# Patient Record
Sex: FEMALE | Race: WHITE | NOT HISPANIC OR LATINO | URBAN - METROPOLITAN AREA
[De-identification: names, ages, dates, MRNs, and addresses within clinical notes are randomized per-mention and may not be internally consistent; named-entity substitution may affect disease eponyms.]

---

## 2023-02-18 PROBLEM — F17.200 TOBACCO USE DISORDER: Status: ACTIVE | Noted: 2023-02-18

## 2023-02-18 PROBLEM — Q51.9 UTERINE ANOMALY: Status: ACTIVE | Noted: 2023-02-18

## 2023-02-18 PROBLEM — B00.2 PRIMARY HERPES SIMPLEX INFECTION OF ORAL REGION: Status: ACTIVE | Noted: 2023-02-18

## 2023-02-18 PROBLEM — H00.024 HORDEOLUM INTERNUM OF LEFT UPPER EYELID: Status: ACTIVE | Noted: 2023-02-18

## 2023-02-18 PROBLEM — E66.3 OVERWEIGHT (BMI 25.0-29.9): Status: ACTIVE | Noted: 2023-02-18

## 2023-02-18 PROBLEM — L03.319 CELLULITIS OF PERIUMBILICAL REGION: Status: ACTIVE | Noted: 2023-02-18

## 2023-02-18 RX ORDER — ERYTHROMYCIN 5 MG/G
OINTMENT OPHTHALMIC
COMMUNITY
End: 2023-06-15 | Stop reason: ALTCHOICE

## 2023-03-06 ENCOUNTER — APPOINTMENT (OUTPATIENT)
Dept: PRIMARY CARE | Facility: CLINIC | Age: 28
End: 2023-03-06
Payer: COMMERCIAL

## 2023-04-05 ENCOUNTER — APPOINTMENT (OUTPATIENT)
Dept: PRIMARY CARE | Facility: CLINIC | Age: 28
End: 2023-04-05
Payer: COMMERCIAL

## 2023-04-06 ENCOUNTER — TELEPHONE (OUTPATIENT)
Dept: PRIMARY CARE | Facility: CLINIC | Age: 28
End: 2023-04-06
Payer: COMMERCIAL

## 2023-04-06 NOTE — TELEPHONE ENCOUNTER
5:50 pm  Called patient per her request yesterday after speaking with office staff.  Voicemail full.      Staff reported that patient called yesterday stating she would be 20+ minutes late for her appointment that she believed to be scheduled at 4:30 pm.  Staff advised the patient that they had made multiple attempts to reach her, her appointment needed to be rescheduled because the provider would be out of the office at that time and the patient's voicemail was full.  The patient became angry and was verbally abusive to staff.  Skylar Jimenez MA, then spoke with the patient in an effort to calm her down and make a new appointment for her.  At that time the patient continued to be verbally abusive and demanded to be seen.  Again, staff advised the patient that our office was willing to see her but needed to move the appointment to a different day and time when the provider would be in the office.  The patient then asked to speak to the  and was advised I was at my other location and would be back in today and would call her.  She asked for the manager's name and when it was provided she continued to use curse words to describe her expectations of the manager.      Due to verbal abuse Dr. Romero does not wish to see this patient as a new patient.  Chart updated to reflect that she should not be rescheduled at this office.

## 2023-06-15 ENCOUNTER — OFFICE VISIT (OUTPATIENT)
Dept: PRIMARY CARE | Facility: CLINIC | Age: 28
End: 2023-06-15
Payer: COMMERCIAL

## 2023-06-15 VITALS
SYSTOLIC BLOOD PRESSURE: 124 MMHG | WEIGHT: 175 LBS | HEART RATE: 89 BPM | DIASTOLIC BLOOD PRESSURE: 87 MMHG | OXYGEN SATURATION: 98 % | BODY MASS INDEX: 29.16 KG/M2 | HEIGHT: 65 IN

## 2023-06-15 DIAGNOSIS — R53.83 OTHER FATIGUE: ICD-10-CM

## 2023-06-15 DIAGNOSIS — L21.0 DANDRUFF IN ADULT: Primary | ICD-10-CM

## 2023-06-15 DIAGNOSIS — L70.8 OTHER ACNE: ICD-10-CM

## 2023-06-15 DIAGNOSIS — Z13.220 SCREENING FOR HYPERLIPIDEMIA: ICD-10-CM

## 2023-06-15 DIAGNOSIS — Z13.1 SCREENING FOR DIABETES MELLITUS: ICD-10-CM

## 2023-06-15 DIAGNOSIS — Z13.21 ENCOUNTER FOR VITAMIN DEFICIENCY SCREENING: ICD-10-CM

## 2023-06-15 DIAGNOSIS — Z11.3 SCREEN FOR STD (SEXUALLY TRANSMITTED DISEASE): ICD-10-CM

## 2023-06-15 PROBLEM — H00.024 HORDEOLUM INTERNUM OF LEFT UPPER EYELID: Status: RESOLVED | Noted: 2023-02-18 | Resolved: 2023-06-15

## 2023-06-15 PROBLEM — L03.319 CELLULITIS OF PERIUMBILICAL REGION: Status: RESOLVED | Noted: 2023-02-18 | Resolved: 2023-06-15

## 2023-06-15 PROBLEM — B00.2 PRIMARY HERPES SIMPLEX INFECTION OF ORAL REGION: Status: RESOLVED | Noted: 2023-02-18 | Resolved: 2023-06-15

## 2023-06-15 PROBLEM — Q51.9 UTERINE ANOMALY: Status: RESOLVED | Noted: 2023-02-18 | Resolved: 2023-06-15

## 2023-06-15 PROBLEM — F17.200 TOBACCO USE DISORDER: Status: RESOLVED | Noted: 2023-02-18 | Resolved: 2023-06-15

## 2023-06-15 PROCEDURE — 87591 N.GONORRHOEAE DNA AMP PROB: CPT

## 2023-06-15 PROCEDURE — 99213 OFFICE O/P EST LOW 20 MIN: CPT | Performed by: FAMILY MEDICINE

## 2023-06-15 PROCEDURE — 1036F TOBACCO NON-USER: CPT | Performed by: FAMILY MEDICINE

## 2023-06-15 PROCEDURE — 87491 CHLMYD TRACH DNA AMP PROBE: CPT

## 2023-06-15 RX ORDER — ADAPALENE AND BENZOYL PEROXIDE GEL, 0.1%/2.5% 1; 25 MG/G; MG/G
1 GEL TOPICAL NIGHTLY
Qty: 45 G | Refills: 1 | Status: SHIPPED | OUTPATIENT
Start: 2023-06-15 | End: 2024-06-14

## 2023-06-15 RX ORDER — BETAMETHASONE DIPROPIONATE 0.5 MG/G
OINTMENT TOPICAL 2 TIMES DAILY PRN
Qty: 45 G | Refills: 0 | Status: SHIPPED | OUTPATIENT
Start: 2023-06-15 | End: 2023-10-13

## 2023-06-15 ASSESSMENT — PATIENT HEALTH QUESTIONNAIRE - PHQ9
2. FEELING DOWN, DEPRESSED OR HOPELESS: NOT AT ALL
1. LITTLE INTEREST OR PLEASURE IN DOING THINGS: NOT AT ALL
SUM OF ALL RESPONSES TO PHQ9 QUESTIONS 1 AND 2: 0

## 2023-06-15 NOTE — LETTER
Skye 15, 2023     Patient: Gabrielle Amador   YOB: 1995   Date of Visit: 6/15/2023       To Whom It May Concern:    Gabrielle Amador was seen in my clinic on 6/15/2023 at 10:00 am. Please excuse Gabrielle for her absence from work on this day to make the appointment.    If you have any questions or concerns, please don't hesitate to call.         Sincerely,         Reyna Pool MD        CC: No Recipients

## 2023-06-15 NOTE — PATIENT INSTRUCTIONS
For the rash under your breasts-mix OTC lotrimin cream and hydrocortisone cream and use twice daily until gone.    Any time you are very itchy use a daily zyrtec (certirizine) 10 mg.  Try the new Acne Rx gel at bedtime and wash off in the morning.  Apply very sparingly.  The Rx ointment is for patches of rash/itching.  Try an OTC tar shampoo once a week (T-gel)  Avoid fragrances in your lotions/soaps.  Good brands are Cerave, Cetaphil, Aveeno

## 2023-06-15 NOTE — PROGRESS NOTES
"Subjective   Patient ID: Gabrielle Amador is a 28 y.o. female who presents for Rash (New patient with rash on scalp for 18 months/Rash under breasts for 4 months/No pcp last 10 years). She wants to have some labs done, will be losing her ins soon.  She also feels fatigued.  She is bothered by some adult acne.        Histories reviewed      Objective   /87   Pulse 89   Ht 1.638 m (5' 4.5\")   Wt 79.4 kg (175 lb)   SpO2 98%   BMI 29.57 kg/m²    Physical Exam  Alert adult, NAD, body wt normal  Affect pleasant and appropriate  Eyes: PERRLA, EOMI, clear bilat  Nose clear  Neck supple, No LAD, No thyromegaly  Heart RRR no murmur  Lungs CTA bilat  Abdomen: pos BS, soft NT/ND  Skin warm dry and clear except for some scalp dryness and flaking and red irritated rash under breasts  Gait WNL        Problem List Items Addressed This Visit    None  Visit Diagnoses       Dandruff in adult    -  Primary    Relevant Medications    betamethasone dipropionate (Diprolene) 0.05 % ointment    Other acne        Relevant Medications    adapalene-benzoyl peroxide 0.1-2.5 % gel    Encounter for vitamin deficiency screening        Relevant Orders    Vitamin D, Total (Completed)    Vitamin B12 (Completed)    Screening for hyperlipidemia        Relevant Orders    Lipid Panel (Completed)    Screening for diabetes mellitus        Relevant Orders    Comprehensive Metabolic Panel (Completed)    Hemoglobin A1C (Completed)    Other fatigue        Relevant Orders    Comprehensive Metabolic Panel (Completed)    TSH with reflex to Free T4 if abnormal (Completed)    Screen for STD (sexually transmitted disease)        Relevant Orders    HIV 1/2 antibodies, rapid (Completed)    Hepatitis C Antibody (Completed)    Hepatitis B surface antigen (Completed)    RPR (Completed)    C. trachomatis / N. gonorrhoeae, DNA probe (Completed)          "

## 2023-06-16 ENCOUNTER — TELEPHONE (OUTPATIENT)
Dept: PRIMARY CARE | Facility: CLINIC | Age: 28
End: 2023-06-16
Payer: COMMERCIAL

## 2023-06-16 LAB
CHLAMYDIA TRACH., AMPLIFIED: NEGATIVE
N. GONORRHEA, AMPLIFIED: NEGATIVE

## 2023-06-16 NOTE — TELEPHONE ENCOUNTER
Type of form: Prior Auth - Betamethasone 0.5%    Received via: fax    Received from: [employer]     Form placed: in your box

## 2023-06-22 NOTE — TELEPHONE ENCOUNTER
Patient states her insurance is ending soon. She would rather get this through [facility] than wait for prior auth to go through. Advised her to call if she needs any further assistance.

## 2023-06-24 ENCOUNTER — LAB (OUTPATIENT)
Dept: LAB | Facility: LAB | Age: 28
End: 2023-06-24
Payer: COMMERCIAL

## 2023-06-24 DIAGNOSIS — Z13.21 ENCOUNTER FOR VITAMIN DEFICIENCY SCREENING: ICD-10-CM

## 2023-06-24 DIAGNOSIS — R53.83 OTHER FATIGUE: ICD-10-CM

## 2023-06-24 DIAGNOSIS — Z13.220 SCREENING FOR HYPERLIPIDEMIA: ICD-10-CM

## 2023-06-24 DIAGNOSIS — Z13.1 SCREENING FOR DIABETES MELLITUS: ICD-10-CM

## 2023-06-24 DIAGNOSIS — Z11.3 SCREEN FOR STD (SEXUALLY TRANSMITTED DISEASE): ICD-10-CM

## 2023-06-24 LAB
ALANINE AMINOTRANSFERASE (SGPT) (U/L) IN SER/PLAS: 13 U/L (ref 7–45)
ALBUMIN (G/DL) IN SER/PLAS: 4.4 G/DL (ref 3.4–5)
ALKALINE PHOSPHATASE (U/L) IN SER/PLAS: 58 U/L (ref 33–110)
ANION GAP IN SER/PLAS: 9 MMOL/L (ref 10–20)
ASPARTATE AMINOTRANSFERASE (SGOT) (U/L) IN SER/PLAS: 20 U/L (ref 9–39)
BILIRUBIN TOTAL (MG/DL) IN SER/PLAS: 1.2 MG/DL (ref 0–1.2)
CALCIDIOL (25 OH VITAMIN D3) (NG/ML) IN SER/PLAS: 15 NG/ML
CALCIUM (MG/DL) IN SER/PLAS: 9.2 MG/DL (ref 8.6–10.3)
CARBON DIOXIDE, TOTAL (MMOL/L) IN SER/PLAS: 30 MMOL/L (ref 21–32)
CHLORIDE (MMOL/L) IN SER/PLAS: 104 MMOL/L (ref 98–107)
CHOLESTEROL (MG/DL) IN SER/PLAS: 151 MG/DL (ref 0–199)
CHOLESTEROL IN HDL (MG/DL) IN SER/PLAS: 57.5 MG/DL
CHOLESTEROL/HDL RATIO: 2.6
COBALAMIN (VITAMIN B12) (PG/ML) IN SER/PLAS: 159 PG/ML (ref 211–911)
CREATININE (MG/DL) IN SER/PLAS: 0.9 MG/DL (ref 0.5–1.05)
ESTIMATED AVERAGE GLUCOSE FOR HBA1C: 103 MG/DL
GFR FEMALE: 89 ML/MIN/1.73M2
GLUCOSE (MG/DL) IN SER/PLAS: 83 MG/DL (ref 74–99)
HEMOGLOBIN A1C/HEMOGLOBIN TOTAL IN BLOOD: 5.2 %
HEPATITIS B VIRUS SURFACE AG PRESENCE IN SERUM: NONREACTIVE
HEPATITIS C VIRUS AB PRESENCE IN SERUM: NONREACTIVE
HIV 1/ 2 AG/AB SCREEN: NONREACTIVE
LDL: 76 MG/DL (ref 0–99)
POTASSIUM (MMOL/L) IN SER/PLAS: 3.9 MMOL/L (ref 3.5–5.3)
PROTEIN TOTAL: 7.3 G/DL (ref 6.4–8.2)
SODIUM (MMOL/L) IN SER/PLAS: 139 MMOL/L (ref 136–145)
THYROTROPIN (MIU/L) IN SER/PLAS BY DETECTION LIMIT <= 0.05 MIU/L: 5.89 MIU/L (ref 0.44–3.98)
THYROXINE (T4) FREE (NG/DL) IN SER/PLAS: 0.77 NG/DL (ref 0.61–1.12)
TRIGLYCERIDE (MG/DL) IN SER/PLAS: 87 MG/DL (ref 0–149)
UREA NITROGEN (MG/DL) IN SER/PLAS: 9 MG/DL (ref 6–23)
VLDL: 17 MG/DL (ref 0–40)

## 2023-06-24 PROCEDURE — 87389 HIV-1 AG W/HIV-1&-2 AB AG IA: CPT

## 2023-06-24 PROCEDURE — 80061 LIPID PANEL: CPT

## 2023-06-24 PROCEDURE — 82607 VITAMIN B-12: CPT

## 2023-06-24 PROCEDURE — 36415 COLL VENOUS BLD VENIPUNCTURE: CPT

## 2023-06-24 PROCEDURE — 86592 SYPHILIS TEST NON-TREP QUAL: CPT

## 2023-06-24 PROCEDURE — 84439 ASSAY OF FREE THYROXINE: CPT

## 2023-06-24 PROCEDURE — 80053 COMPREHEN METABOLIC PANEL: CPT

## 2023-06-24 PROCEDURE — 86803 HEPATITIS C AB TEST: CPT

## 2023-06-24 PROCEDURE — 83036 HEMOGLOBIN GLYCOSYLATED A1C: CPT

## 2023-06-24 PROCEDURE — 84443 ASSAY THYROID STIM HORMONE: CPT

## 2023-06-24 PROCEDURE — 82306 VITAMIN D 25 HYDROXY: CPT

## 2023-06-24 PROCEDURE — 87340 HEPATITIS B SURFACE AG IA: CPT

## 2023-06-25 LAB — RPR MONITORING: NONREACTIVE

## 2023-06-26 ENCOUNTER — TELEPHONE (OUTPATIENT)
Dept: PRIMARY CARE | Facility: CLINIC | Age: 28
End: 2023-06-26
Payer: COMMERCIAL

## 2023-06-26 DIAGNOSIS — R79.89 ABNORMAL TSH: Primary | ICD-10-CM

## 2023-06-28 ENCOUNTER — LAB (OUTPATIENT)
Dept: LAB | Facility: LAB | Age: 28
End: 2023-06-28
Payer: COMMERCIAL

## 2023-06-28 DIAGNOSIS — R79.89 ABNORMAL TSH: ICD-10-CM

## 2023-06-28 LAB
THYROPEROXIDASE AB (IU/ML) IN SER/PLAS: <28 IU/ML
TRIIODOTHYRONINE (T3) (NG/DL) IN SER/PLAS: 153 NG/DL (ref 60–200)

## 2023-06-28 PROCEDURE — 36415 COLL VENOUS BLD VENIPUNCTURE: CPT

## 2023-06-28 PROCEDURE — 84480 ASSAY TRIIODOTHYRONINE (T3): CPT

## 2023-06-28 PROCEDURE — 86376 MICROSOMAL ANTIBODY EACH: CPT

## 2023-06-29 ENCOUNTER — TELEMEDICINE (OUTPATIENT)
Dept: PRIMARY CARE | Facility: CLINIC | Age: 28
End: 2023-06-29
Payer: COMMERCIAL

## 2023-06-29 DIAGNOSIS — R79.89 ABNORMAL TSH: ICD-10-CM

## 2023-06-29 DIAGNOSIS — E53.8 VITAMIN B12 DEFICIENCY: ICD-10-CM

## 2023-06-29 DIAGNOSIS — E55.9 VITAMIN D DEFICIENCY: Primary | ICD-10-CM

## 2023-06-29 PROCEDURE — 99213 OFFICE O/P EST LOW 20 MIN: CPT | Performed by: FAMILY MEDICINE

## 2023-07-16 NOTE — PROGRESS NOTES
This is a VIRTUAL/TELEPHONE visit in place of a scheduled office visit due to current Covid-19 situation.  Pt is informed at the beginning of the call that he/she may be billed for this virtual/telephone appointment and if the insurance company does not cover this service, the pt may be billed by .    Total phone visit time: 15 min  Pt requested virtual due to her work schedule        Subjective   Patient ID: Gabrielle Amador is a 28 y.o. female who presents for Follow-up (Review labs).  We reviewed all of her labs from 6/24/23 via zoom.  Lipids and AIC look good.  STD testing all negative.    Vit B12 low  Vit D low  TSH high but T4 normal    Histories reviewed      Objective   There were no vitals taken for this visit.   Physical Exam      Problem List Items Addressed This Visit    None  Visit Diagnoses       Vitamin D deficiency    -  Primary    Vitamin B12 deficiency        Abnormal TSH              Reviewed need for Vit D and B12 replacement  She will add 2000 international units of vitamin D3 daily  She will add 1000 mcg of vitamin B12 twice a day  These labs to be checked again in 6 months  We will check other thyroid labs to make sure that the elevated TSH is not indicating an early sign of thyroid disease.